# Patient Record
Sex: FEMALE | Race: WHITE
[De-identification: names, ages, dates, MRNs, and addresses within clinical notes are randomized per-mention and may not be internally consistent; named-entity substitution may affect disease eponyms.]

---

## 2017-01-31 RX ORDER — ATENOLOL 50 MG/1
TABLET ORAL
Qty: 180 TAB | Refills: 0 | Status: SHIPPED | OUTPATIENT
Start: 2017-01-31

## 2017-02-13 RX ORDER — VERAPAMIL HYDROCHLORIDE 240 MG/1
TABLET, FILM COATED, EXTENDED RELEASE ORAL
Qty: 90 TAB | Refills: 0 | Status: SHIPPED | OUTPATIENT
Start: 2017-02-13

## 2017-02-13 RX ORDER — MISOPROSTOL 200 UG/1
TABLET ORAL
Qty: 90 TAB | Refills: 0 | Status: SHIPPED | OUTPATIENT
Start: 2017-02-13

## 2017-02-13 NOTE — TELEPHONE ENCOUNTER
Was the patient seen in the last year in this department? Yes 2/2/16    Does patient have an active prescription for medications requested? No     Received Request Via: Pharmacy

## 2017-03-16 ENCOUNTER — HOSPITAL ENCOUNTER (OUTPATIENT)
Dept: HOSPITAL 8 - CFH | Age: 77
Discharge: HOME | End: 2017-03-16
Attending: SURGERY
Payer: MEDICARE

## 2017-03-16 DIAGNOSIS — K63.89: ICD-10-CM

## 2017-03-16 DIAGNOSIS — J47.9: ICD-10-CM

## 2017-03-16 DIAGNOSIS — R91.1: ICD-10-CM

## 2017-03-16 DIAGNOSIS — M85.88: ICD-10-CM

## 2017-03-16 DIAGNOSIS — N95.8: ICD-10-CM

## 2017-03-16 DIAGNOSIS — C18.9: ICD-10-CM

## 2017-03-16 DIAGNOSIS — J43.8: ICD-10-CM

## 2017-03-16 DIAGNOSIS — M47.896: ICD-10-CM

## 2017-03-16 DIAGNOSIS — Z13.820: Primary | ICD-10-CM

## 2017-03-16 DIAGNOSIS — Z01.818: ICD-10-CM

## 2017-03-16 PROCEDURE — 71260 CT THORAX DX C+: CPT

## 2017-03-16 PROCEDURE — 82565 ASSAY OF CREATININE: CPT

## 2017-03-16 PROCEDURE — 74177 CT ABD & PELVIS W/CONTRAST: CPT

## 2017-03-16 PROCEDURE — 77080 DXA BONE DENSITY AXIAL: CPT

## 2017-05-25 ENCOUNTER — HOSPITAL ENCOUNTER (INPATIENT)
Dept: HOSPITAL 8 - OR | Age: 77
LOS: 1 days | Discharge: HOME | DRG: 394 | End: 2017-05-26
Admitting: INTERNAL MEDICINE
Payer: MEDICARE

## 2017-05-25 VITALS — BODY MASS INDEX: 40.08 KG/M2 | HEIGHT: 63 IN | WEIGHT: 226.19 LBS

## 2017-05-25 VITALS — SYSTOLIC BLOOD PRESSURE: 188 MMHG | DIASTOLIC BLOOD PRESSURE: 83 MMHG

## 2017-05-25 DIAGNOSIS — Z88.5: ICD-10-CM

## 2017-05-25 DIAGNOSIS — M47.816: ICD-10-CM

## 2017-05-25 DIAGNOSIS — E44.1: ICD-10-CM

## 2017-05-25 DIAGNOSIS — Z99.81: ICD-10-CM

## 2017-05-25 DIAGNOSIS — E03.9: ICD-10-CM

## 2017-05-25 DIAGNOSIS — I11.9: ICD-10-CM

## 2017-05-25 DIAGNOSIS — Z87.11: ICD-10-CM

## 2017-05-25 DIAGNOSIS — K64.8: Primary | ICD-10-CM

## 2017-05-25 DIAGNOSIS — Z86.010: ICD-10-CM

## 2017-05-25 DIAGNOSIS — Z88.8: ICD-10-CM

## 2017-05-25 DIAGNOSIS — Z85.048: ICD-10-CM

## 2017-05-25 DIAGNOSIS — Z90.49: ICD-10-CM

## 2017-05-25 DIAGNOSIS — M19.90: ICD-10-CM

## 2017-05-25 DIAGNOSIS — D50.9: ICD-10-CM

## 2017-05-25 DIAGNOSIS — E78.5: ICD-10-CM

## 2017-05-25 DIAGNOSIS — M81.0: ICD-10-CM

## 2017-05-25 DIAGNOSIS — D62: ICD-10-CM

## 2017-05-25 DIAGNOSIS — J44.9: ICD-10-CM

## 2017-05-25 DIAGNOSIS — Z66: ICD-10-CM

## 2017-05-25 DIAGNOSIS — E55.9: ICD-10-CM

## 2017-05-25 DIAGNOSIS — Z87.891: ICD-10-CM

## 2017-05-25 LAB
BUN SERPL-MCNC: 17 MG/DL (ref 7–18)
TIBC SERPL-MCNC: 502 MCG/DL (ref 250–450)

## 2017-05-25 PROCEDURE — 86850 RBC ANTIBODY SCREEN: CPT

## 2017-05-25 PROCEDURE — 80048 BASIC METABOLIC PNL TOTAL CA: CPT

## 2017-05-25 PROCEDURE — 85651 RBC SED RATE NONAUTOMATED: CPT

## 2017-05-25 PROCEDURE — 85025 COMPLETE CBC W/AUTO DIFF WBC: CPT

## 2017-05-25 PROCEDURE — 86900 BLOOD TYPING SEROLOGIC ABO: CPT

## 2017-05-25 PROCEDURE — 93005 ELECTROCARDIOGRAM TRACING: CPT

## 2017-05-25 PROCEDURE — 80053 COMPREHEN METABOLIC PANEL: CPT

## 2017-05-25 PROCEDURE — 36415 COLL VENOUS BLD VENIPUNCTURE: CPT

## 2017-05-25 PROCEDURE — 96374 THER/PROPH/DIAG INJ IV PUSH: CPT

## 2017-05-25 PROCEDURE — 83550 IRON BINDING TEST: CPT

## 2017-05-25 PROCEDURE — 83540 ASSAY OF IRON: CPT

## 2017-05-25 PROCEDURE — 85730 THROMBOPLASTIN TIME PARTIAL: CPT

## 2017-05-25 PROCEDURE — 82040 ASSAY OF SERUM ALBUMIN: CPT

## 2017-05-25 PROCEDURE — 99151 MOD SED SAME PHYS/QHP <5 YRS: CPT

## 2017-05-25 PROCEDURE — 85610 PROTHROMBIN TIME: CPT

## 2017-05-25 PROCEDURE — 85018 HEMOGLOBIN: CPT

## 2017-05-25 PROCEDURE — 82728 ASSAY OF FERRITIN: CPT

## 2017-05-25 RX ADMIN — POLYETHYLENE GLYCOL-3350 AND ELECTROLYTES SCH ML: 236; 6.74; 5.86; 2.97; 22.74 POWDER, FOR SOLUTION ORAL at 20:10

## 2017-05-25 RX ADMIN — ATENOLOL SCH MG: 50 TABLET ORAL at 21:43

## 2017-05-25 RX ADMIN — PANTOPRAZOLE SODIUM SCH MG: 40 TABLET, DELAYED RELEASE ORAL at 17:00

## 2017-05-26 VITALS — SYSTOLIC BLOOD PRESSURE: 145 MMHG | DIASTOLIC BLOOD PRESSURE: 75 MMHG

## 2017-05-26 VITALS — DIASTOLIC BLOOD PRESSURE: 61 MMHG | SYSTOLIC BLOOD PRESSURE: 104 MMHG

## 2017-05-26 VITALS — SYSTOLIC BLOOD PRESSURE: 130 MMHG | DIASTOLIC BLOOD PRESSURE: 74 MMHG

## 2017-05-26 VITALS — DIASTOLIC BLOOD PRESSURE: 76 MMHG | SYSTOLIC BLOOD PRESSURE: 178 MMHG

## 2017-05-26 LAB
AST SERPL-CCNC: 17 U/L (ref 15–37)
BUN SERPL-MCNC: 9 MG/DL (ref 7–18)

## 2017-05-26 PROCEDURE — 0DJD8ZZ INSPECTION OF LOWER INTESTINAL TRACT, VIA NATURAL OR ARTIFICIAL OPENING ENDOSCOPIC: ICD-10-PCS | Performed by: INTERNAL MEDICINE

## 2017-05-26 RX ADMIN — POLYETHYLENE GLYCOL-3350 AND ELECTROLYTES SCH ML: 236; 6.74; 5.86; 2.97; 22.74 POWDER, FOR SOLUTION ORAL at 03:34

## 2017-05-26 RX ADMIN — ATENOLOL SCH MG: 50 TABLET ORAL at 08:28

## 2017-05-26 RX ADMIN — PANTOPRAZOLE SODIUM SCH MG: 40 TABLET, DELAYED RELEASE ORAL at 08:00

## 2017-07-03 RX ORDER — LEVOTHYROXINE SODIUM 0.05 MG/1
TABLET ORAL
Qty: 90 TAB | Refills: 3 | Status: SHIPPED | OUTPATIENT
Start: 2017-07-03

## 2018-12-10 ENCOUNTER — HOSPITAL ENCOUNTER (OUTPATIENT)
Dept: HOSPITAL 8 - LAB | Age: 78
Discharge: HOME | End: 2018-12-10
Attending: INTERNAL MEDICINE
Payer: MEDICARE

## 2018-12-10 DIAGNOSIS — E03.8: ICD-10-CM

## 2018-12-10 DIAGNOSIS — D50.9: Primary | ICD-10-CM

## 2018-12-10 LAB
BASOPHILS # BLD AUTO: 0.05 X10^3/UL (ref 0–0.1)
BASOPHILS NFR BLD AUTO: 1 % (ref 0–1)
CREAT SERPL-MCNC: 1.06 MG/DL (ref 0.55–1.02)
EOSINOPHIL # BLD AUTO: 0.26 X10^3/UL (ref 0–0.4)
EOSINOPHIL NFR BLD AUTO: 3 % (ref 1–7)
ERYTHROCYTE [DISTWIDTH] IN BLOOD BY AUTOMATED COUNT: 14 % (ref 9.6–15.2)
LYMPHOCYTES # BLD AUTO: 1.79 X10^3/UL (ref 1–3.4)
LYMPHOCYTES NFR BLD AUTO: 19 % (ref 22–44)
MCH RBC QN AUTO: 30.7 PG (ref 27–34.8)
MCHC RBC AUTO-ENTMCNC: 32.5 G/DL (ref 32.4–35.8)
MCV RBC AUTO: 94.5 FL (ref 80–100)
MD: NO
MONOCYTES # BLD AUTO: 0.61 X10^3/UL (ref 0.2–0.8)
MONOCYTES NFR BLD AUTO: 7 % (ref 2–9)
NEUTROPHILS # BLD AUTO: 6.67 X10^3/UL (ref 1.8–6.8)
NEUTROPHILS NFR BLD AUTO: 71 % (ref 42–75)
PLATELET # BLD AUTO: 282 X10^3/UL (ref 130–400)
PMV BLD AUTO: 7.2 FL (ref 7.4–10.4)
RBC # BLD AUTO: 4.58 X10^6/UL (ref 3.82–5.3)
T4 FREE SERPL-MCNC: 1.21 NG/DL (ref 0.76–1.46)
TSH SERPL-ACNC: 3.54 MIU/L (ref 0.36–3.74)

## 2018-12-10 PROCEDURE — 85025 COMPLETE CBC W/AUTO DIFF WBC: CPT

## 2018-12-10 PROCEDURE — 84443 ASSAY THYROID STIM HORMONE: CPT

## 2018-12-10 PROCEDURE — 82728 ASSAY OF FERRITIN: CPT

## 2018-12-10 PROCEDURE — 36415 COLL VENOUS BLD VENIPUNCTURE: CPT

## 2018-12-10 PROCEDURE — 84439 ASSAY OF FREE THYROXINE: CPT

## 2018-12-10 PROCEDURE — 82565 ASSAY OF CREATININE: CPT

## 2018-12-13 ENCOUNTER — HOSPITAL ENCOUNTER (OUTPATIENT)
Dept: HOSPITAL 8 - RAD | Age: 78
Discharge: HOME | End: 2018-12-13
Attending: INTERNAL MEDICINE
Payer: MEDICARE

## 2018-12-13 DIAGNOSIS — G57.33: ICD-10-CM

## 2018-12-13 DIAGNOSIS — G31.9: Primary | ICD-10-CM

## 2018-12-13 DIAGNOSIS — J44.9: ICD-10-CM

## 2018-12-13 PROCEDURE — A9585 GADOBUTROL INJECTION: HCPCS

## 2018-12-13 PROCEDURE — 70553 MRI BRAIN STEM W/O & W/DYE: CPT

## 2019-06-06 ENCOUNTER — HOSPITAL ENCOUNTER (OUTPATIENT)
Dept: HOSPITAL 8 - CARD | Age: 79
Discharge: HOME | End: 2019-06-06
Attending: REGISTERED NURSE
Payer: MEDICARE

## 2019-06-06 DIAGNOSIS — E03.9: ICD-10-CM

## 2019-06-06 DIAGNOSIS — Z88.5: ICD-10-CM

## 2019-06-06 DIAGNOSIS — Z88.8: ICD-10-CM

## 2019-06-06 DIAGNOSIS — I10: ICD-10-CM

## 2019-06-06 DIAGNOSIS — J44.9: Primary | ICD-10-CM

## 2019-06-06 PROCEDURE — 94729 DIFFUSING CAPACITY: CPT

## 2019-06-06 PROCEDURE — 94618 PULMONARY STRESS TESTING: CPT

## 2019-06-06 PROCEDURE — 94726 PLETHYSMOGRAPHY LUNG VOLUMES: CPT

## 2019-06-06 PROCEDURE — 94060 EVALUATION OF WHEEZING: CPT

## 2019-08-06 ENCOUNTER — HOSPITAL ENCOUNTER (OUTPATIENT)
Dept: HOSPITAL 8 - CFH | Age: 79
Discharge: HOME | End: 2019-08-06
Attending: INTERNAL MEDICINE
Payer: MEDICARE

## 2019-08-06 DIAGNOSIS — Z87.891: ICD-10-CM

## 2019-08-06 DIAGNOSIS — I10: ICD-10-CM

## 2019-08-06 DIAGNOSIS — J43.9: ICD-10-CM

## 2019-08-06 DIAGNOSIS — I35.8: ICD-10-CM

## 2019-08-06 DIAGNOSIS — Z01.810: Primary | ICD-10-CM

## 2019-08-06 PROCEDURE — 93306 TTE W/DOPPLER COMPLETE: CPT

## 2019-08-06 PROCEDURE — 93017 CV STRESS TEST TRACING ONLY: CPT

## 2019-08-06 PROCEDURE — A9502 TC99M TETROFOSMIN: HCPCS

## 2019-08-06 PROCEDURE — 78452 HT MUSCLE IMAGE SPECT MULT: CPT

## 2020-01-18 ENCOUNTER — HOSPITAL ENCOUNTER (INPATIENT)
Dept: HOSPITAL 8 - ED | Age: 80
LOS: 3 days | Discharge: HOME | DRG: 280 | End: 2020-01-21
Attending: INTERNAL MEDICINE | Admitting: HOSPITALIST
Payer: MEDICARE

## 2020-01-18 VITALS — HEIGHT: 63 IN | WEIGHT: 229.06 LBS | BODY MASS INDEX: 40.59 KG/M2

## 2020-01-18 DIAGNOSIS — Z99.81: ICD-10-CM

## 2020-01-18 DIAGNOSIS — J96.21: ICD-10-CM

## 2020-01-18 DIAGNOSIS — Z85.038: ICD-10-CM

## 2020-01-18 DIAGNOSIS — J44.1: ICD-10-CM

## 2020-01-18 DIAGNOSIS — Z88.8: ICD-10-CM

## 2020-01-18 DIAGNOSIS — I26.99: ICD-10-CM

## 2020-01-18 DIAGNOSIS — Z88.5: ICD-10-CM

## 2020-01-18 DIAGNOSIS — I27.81: ICD-10-CM

## 2020-01-18 DIAGNOSIS — Z90.49: ICD-10-CM

## 2020-01-18 DIAGNOSIS — Z82.5: ICD-10-CM

## 2020-01-18 DIAGNOSIS — E03.9: ICD-10-CM

## 2020-01-18 DIAGNOSIS — I21.4: Primary | ICD-10-CM

## 2020-01-18 DIAGNOSIS — Z96.651: ICD-10-CM

## 2020-01-18 DIAGNOSIS — E78.5: ICD-10-CM

## 2020-01-18 DIAGNOSIS — I10: ICD-10-CM

## 2020-01-18 DIAGNOSIS — D69.6: ICD-10-CM

## 2020-01-18 DIAGNOSIS — Z87.891: ICD-10-CM

## 2020-01-18 DIAGNOSIS — Z79.01: ICD-10-CM

## 2020-01-18 DIAGNOSIS — J98.11: ICD-10-CM

## 2020-01-18 DIAGNOSIS — E66.01: ICD-10-CM

## 2020-01-18 DIAGNOSIS — Z80.3: ICD-10-CM

## 2020-01-18 LAB — TROPONIN I SERPL-MCNC: 1.7 NG/ML (ref 0–0.04)

## 2020-01-18 PROCEDURE — 84100 ASSAY OF PHOSPHORUS: CPT

## 2020-01-18 PROCEDURE — 94640 AIRWAY INHALATION TREATMENT: CPT

## 2020-01-18 PROCEDURE — 83735 ASSAY OF MAGNESIUM: CPT

## 2020-01-18 PROCEDURE — 84484 ASSAY OF TROPONIN QUANT: CPT

## 2020-01-18 PROCEDURE — 86140 C-REACTIVE PROTEIN: CPT

## 2020-01-18 PROCEDURE — 93005 ELECTROCARDIOGRAM TRACING: CPT

## 2020-01-18 PROCEDURE — 85025 COMPLETE CBC W/AUTO DIFF WBC: CPT

## 2020-01-18 PROCEDURE — 36415 COLL VENOUS BLD VENIPUNCTURE: CPT

## 2020-01-18 PROCEDURE — 80053 COMPREHEN METABOLIC PANEL: CPT

## 2020-01-18 PROCEDURE — 80048 BASIC METABOLIC PNL TOTAL CA: CPT

## 2020-01-18 PROCEDURE — 93306 TTE W/DOPPLER COMPLETE: CPT

## 2020-01-18 PROCEDURE — 96365 THER/PROPH/DIAG IV INF INIT: CPT

## 2020-01-18 PROCEDURE — 71275 CT ANGIOGRAPHY CHEST: CPT

## 2020-01-18 PROCEDURE — 85520 HEPARIN ASSAY: CPT

## 2020-01-18 PROCEDURE — 83880 ASSAY OF NATRIURETIC PEPTIDE: CPT

## 2020-01-18 NOTE — NUR
PT UP TO BSC ONE ASSIST PT O2 DESAT TO 78% PT O2 INCREASED TO 8L MASK AND PT UP 
TO 90% RESTING IN BED

## 2020-01-19 VITALS — DIASTOLIC BLOOD PRESSURE: 99 MMHG | SYSTOLIC BLOOD PRESSURE: 169 MMHG

## 2020-01-19 VITALS — SYSTOLIC BLOOD PRESSURE: 153 MMHG | DIASTOLIC BLOOD PRESSURE: 82 MMHG

## 2020-01-19 VITALS — SYSTOLIC BLOOD PRESSURE: 176 MMHG | DIASTOLIC BLOOD PRESSURE: 92 MMHG

## 2020-01-19 LAB
ALBUMIN SERPL-MCNC: 2.6 G/DL (ref 3.4–5)
ALP SERPL-CCNC: 70 U/L (ref 45–117)
ALT SERPL-CCNC: 20 U/L (ref 12–78)
ANION GAP SERPL CALC-SCNC: 7 MMOL/L (ref 5–15)
BASOPHILS # BLD AUTO: 0 X10^3/UL (ref 0–0.1)
BASOPHILS NFR BLD AUTO: 0 % (ref 0–1)
BILIRUB SERPL-MCNC: 0.3 MG/DL (ref 0.2–1)
CALCIUM SERPL-MCNC: 9 MG/DL (ref 8.5–10.1)
CHLORIDE SERPL-SCNC: 107 MMOL/L (ref 98–107)
CREAT SERPL-MCNC: 0.73 MG/DL (ref 0.55–1.02)
EOSINOPHIL # BLD AUTO: 0 X10^3/UL (ref 0–0.4)
EOSINOPHIL NFR BLD AUTO: 0 % (ref 1–7)
ERYTHROCYTE [DISTWIDTH] IN BLOOD BY AUTOMATED COUNT: 14.3 % (ref 9.6–15.2)
LYMPHOCYTES # BLD AUTO: 0.6 X10^3/UL (ref 1–3.4)
LYMPHOCYTES NFR BLD AUTO: 8 % (ref 22–44)
MCH RBC QN AUTO: 30.1 PG (ref 27–34.8)
MCHC RBC AUTO-ENTMCNC: 32.2 G/DL (ref 32.4–35.8)
MCV RBC AUTO: 93.5 FL (ref 80–100)
MD: NO
MONOCYTES # BLD AUTO: 0.27 X10^3/UL (ref 0.2–0.8)
MONOCYTES NFR BLD AUTO: 3 % (ref 2–9)
NEUTROPHILS # BLD AUTO: 7.09 X10^3/UL (ref 1.8–6.8)
NEUTROPHILS NFR BLD AUTO: 89 % (ref 42–75)
PLATELET # BLD AUTO: 127 X10^3/UL (ref 130–400)
PMV BLD AUTO: 7.8 FL (ref 7.4–10.4)
PROT SERPL-MCNC: 7 G/DL (ref 6.4–8.2)
RBC # BLD AUTO: 4.04 X10^6/UL (ref 3.82–5.3)
TROPONIN I SERPL-MCNC: 1.26 NG/ML (ref 0–0.04)
TROPONIN I SERPL-MCNC: 1.28 NG/ML (ref 0–0.04)

## 2020-01-19 RX ADMIN — METOPROLOL TARTRATE SCH MG: 50 TABLET, FILM COATED ORAL at 18:10

## 2020-01-19 RX ADMIN — PANTOPRAZOLE SODIUM SCH MG: 40 TABLET, DELAYED RELEASE ORAL at 08:00

## 2020-01-19 RX ADMIN — LISINOPRIL SCH MG: 5 TABLET ORAL at 11:32

## 2020-01-19 RX ADMIN — VERAPAMIL HYDROCHLORIDE SCH MG: 240 TABLET, FILM COATED, EXTENDED RELEASE ORAL at 09:47

## 2020-01-19 RX ADMIN — INSULIN LISPRO SCH NOTE: 100 INJECTION, SOLUTION INTRAVENOUS; SUBCUTANEOUS at 09:30

## 2020-01-19 RX ADMIN — INSULIN LISPRO SCH NOTE: 100 INJECTION, SOLUTION INTRAVENOUS; SUBCUTANEOUS at 16:07

## 2020-01-19 RX ADMIN — METOPROLOL TARTRATE SCH MG: 50 TABLET, FILM COATED ORAL at 11:32

## 2020-01-19 RX ADMIN — FERROUS SULFATE TAB 325 MG (65 MG ELEMENTAL FE) SCH MG: 325 (65 FE) TAB at 16:07

## 2020-01-19 RX ADMIN — DOCUSATE SODIUM 50MG AND SENNOSIDES 8.6MG SCH TAB: 8.6; 5 TABLET, FILM COATED ORAL at 09:47

## 2020-01-19 RX ADMIN — SODIUM CHLORIDE, PRESERVATIVE FREE SCH ML: 5 INJECTION INTRAVENOUS at 20:21

## 2020-01-19 RX ADMIN — MISOPROSTOL SCH MCG: 200 TABLET ORAL at 11:32

## 2020-01-19 RX ADMIN — SODIUM CHLORIDE, PRESERVATIVE FREE SCH ML: 5 INJECTION INTRAVENOUS at 01:12

## 2020-01-19 RX ADMIN — ACETAMINOPHEN PRN MG: 325 TABLET, FILM COATED ORAL at 20:29

## 2020-01-19 RX ADMIN — HEPARIN SODIUM PRN UNITS: 5000 INJECTION, SOLUTION INTRAVENOUS; SUBCUTANEOUS at 11:46

## 2020-01-19 RX ADMIN — FERROUS SULFATE TAB 325 MG (65 MG ELEMENTAL FE) SCH MG: 325 (65 FE) TAB at 08:00

## 2020-01-19 RX ADMIN — LEVOTHYROXINE SODIUM SCH MCG: 50 TABLET ORAL at 09:47

## 2020-01-19 RX ADMIN — HEPARIN SODIUM PRN UNITS: 5000 INJECTION, SOLUTION INTRAVENOUS; SUBCUTANEOUS at 18:17

## 2020-01-19 RX ADMIN — SODIUM CHLORIDE, PRESERVATIVE FREE SCH ML: 5 INJECTION INTRAVENOUS at 09:48

## 2020-01-19 RX ADMIN — PANTOPRAZOLE SODIUM SCH MG: 40 TABLET, DELAYED RELEASE ORAL at 16:07

## 2020-01-19 RX ADMIN — INSULIN LISPRO SCH NOTE: 100 INJECTION, SOLUTION INTRAVENOUS; SUBCUTANEOUS at 01:30

## 2020-01-20 VITALS — DIASTOLIC BLOOD PRESSURE: 79 MMHG | SYSTOLIC BLOOD PRESSURE: 126 MMHG

## 2020-01-20 VITALS — DIASTOLIC BLOOD PRESSURE: 80 MMHG | SYSTOLIC BLOOD PRESSURE: 157 MMHG

## 2020-01-20 VITALS — DIASTOLIC BLOOD PRESSURE: 72 MMHG | SYSTOLIC BLOOD PRESSURE: 119 MMHG

## 2020-01-20 VITALS — DIASTOLIC BLOOD PRESSURE: 72 MMHG | SYSTOLIC BLOOD PRESSURE: 120 MMHG

## 2020-01-20 RX ADMIN — IPRATROPIUM BROMIDE AND ALBUTEROL SULFATE SCH ML: 2.5; .5 SOLUTION RESPIRATORY (INHALATION) at 21:00

## 2020-01-20 RX ADMIN — INSULIN LISPRO SCH NOTE: 100 INJECTION, SOLUTION INTRAVENOUS; SUBCUTANEOUS at 01:30

## 2020-01-20 RX ADMIN — METOPROLOL TARTRATE SCH MG: 50 TABLET, FILM COATED ORAL at 08:51

## 2020-01-20 RX ADMIN — MISOPROSTOL SCH MCG: 200 TABLET ORAL at 09:00

## 2020-01-20 RX ADMIN — PANTOPRAZOLE SODIUM SCH MG: 40 TABLET, DELAYED RELEASE ORAL at 08:00

## 2020-01-20 RX ADMIN — DOCUSATE SODIUM 50MG AND SENNOSIDES 8.6MG SCH TAB: 8.6; 5 TABLET, FILM COATED ORAL at 08:46

## 2020-01-20 RX ADMIN — BUDESONIDE SCH MG: 0.5 INHALANT RESPIRATORY (INHALATION) at 21:00

## 2020-01-20 RX ADMIN — ACETAMINOPHEN PRN MG: 325 TABLET, FILM COATED ORAL at 08:45

## 2020-01-20 RX ADMIN — LISINOPRIL SCH MG: 5 TABLET ORAL at 08:46

## 2020-01-20 RX ADMIN — APIXABAN SCH MG: 5 TABLET, FILM COATED ORAL at 20:37

## 2020-01-20 RX ADMIN — IPRATROPIUM BROMIDE AND ALBUTEROL SULFATE SCH ML: 2.5; .5 SOLUTION RESPIRATORY (INHALATION) at 14:44

## 2020-01-20 RX ADMIN — VERAPAMIL HYDROCHLORIDE SCH MG: 240 TABLET, FILM COATED, EXTENDED RELEASE ORAL at 08:45

## 2020-01-20 RX ADMIN — FERROUS SULFATE TAB 325 MG (65 MG ELEMENTAL FE) SCH MG: 325 (65 FE) TAB at 08:00

## 2020-01-20 RX ADMIN — SODIUM CHLORIDE, PRESERVATIVE FREE SCH ML: 5 INJECTION INTRAVENOUS at 08:47

## 2020-01-20 RX ADMIN — METOPROLOL TARTRATE SCH MG: 50 TABLET, FILM COATED ORAL at 17:50

## 2020-01-20 RX ADMIN — LEVOTHYROXINE SODIUM SCH MCG: 50 TABLET ORAL at 08:45

## 2020-01-21 VITALS — SYSTOLIC BLOOD PRESSURE: 111 MMHG | DIASTOLIC BLOOD PRESSURE: 70 MMHG

## 2020-01-21 VITALS — SYSTOLIC BLOOD PRESSURE: 136 MMHG | DIASTOLIC BLOOD PRESSURE: 81 MMHG

## 2020-01-21 LAB
ANION GAP SERPL CALC-SCNC: 5 MMOL/L (ref 5–15)
BASOPHILS # BLD AUTO: 0.02 X10^3/UL (ref 0–0.1)
BASOPHILS NFR BLD AUTO: 0 % (ref 0–1)
CALCIUM SERPL-MCNC: 8.5 MG/DL (ref 8.5–10.1)
CHLORIDE SERPL-SCNC: 108 MMOL/L (ref 98–107)
CREAT SERPL-MCNC: 0.66 MG/DL (ref 0.55–1.02)
EOSINOPHIL # BLD AUTO: 0.17 X10^3/UL (ref 0–0.4)
EOSINOPHIL NFR BLD AUTO: 3 % (ref 1–7)
ERYTHROCYTE [DISTWIDTH] IN BLOOD BY AUTOMATED COUNT: 13.8 % (ref 9.6–15.2)
LYMPHOCYTES # BLD AUTO: 1.17 X10^3/UL (ref 1–3.4)
LYMPHOCYTES NFR BLD AUTO: 18 % (ref 22–44)
MCH RBC QN AUTO: 30.6 PG (ref 27–34.8)
MCHC RBC AUTO-ENTMCNC: 32.6 G/DL (ref 32.4–35.8)
MCV RBC AUTO: 93.7 FL (ref 80–100)
MD: NO
MONOCYTES # BLD AUTO: 0.61 X10^3/UL (ref 0.2–0.8)
MONOCYTES NFR BLD AUTO: 9 % (ref 2–9)
NEUTROPHILS # BLD AUTO: 4.73 X10^3/UL (ref 1.8–6.8)
NEUTROPHILS NFR BLD AUTO: 71 % (ref 42–75)
PLATELET # BLD AUTO: 102 X10^3/UL (ref 130–400)
PMV BLD AUTO: 7.3 FL (ref 7.4–10.4)
RBC # BLD AUTO: 3.6 X10^6/UL (ref 3.82–5.3)

## 2020-01-21 RX ADMIN — DOCUSATE SODIUM 50MG AND SENNOSIDES 8.6MG SCH TAB: 8.6; 5 TABLET, FILM COATED ORAL at 08:44

## 2020-01-21 RX ADMIN — ACETAMINOPHEN PRN MG: 325 TABLET, FILM COATED ORAL at 06:08

## 2020-01-21 RX ADMIN — IPRATROPIUM BROMIDE AND ALBUTEROL SULFATE SCH ML: 2.5; .5 SOLUTION RESPIRATORY (INHALATION) at 09:19

## 2020-01-21 RX ADMIN — BUDESONIDE SCH MG: 0.5 INHALANT RESPIRATORY (INHALATION) at 09:20

## 2020-01-21 RX ADMIN — LISINOPRIL SCH MG: 5 TABLET ORAL at 08:42

## 2020-01-21 RX ADMIN — APIXABAN SCH MG: 5 TABLET, FILM COATED ORAL at 08:41

## 2020-01-21 RX ADMIN — LEVOTHYROXINE SODIUM SCH MCG: 50 TABLET ORAL at 08:42

## 2020-01-21 RX ADMIN — METOPROLOL TARTRATE SCH MG: 50 TABLET, FILM COATED ORAL at 05:51

## 2020-01-21 RX ADMIN — VERAPAMIL HYDROCHLORIDE SCH MG: 240 TABLET, FILM COATED, EXTENDED RELEASE ORAL at 08:43

## 2020-01-21 RX ADMIN — MISOPROSTOL SCH MCG: 200 TABLET ORAL at 08:42

## 2020-01-21 RX ADMIN — IPRATROPIUM BROMIDE AND ALBUTEROL SULFATE SCH ML: 2.5; .5 SOLUTION RESPIRATORY (INHALATION) at 02:43

## 2020-05-27 ENCOUNTER — HOSPITAL ENCOUNTER (OUTPATIENT)
Dept: LAB | Facility: MEDICAL CENTER | Age: 80
End: 2020-05-27
Attending: INTERNAL MEDICINE
Payer: MEDICARE

## 2020-05-27 LAB
ALBUMIN SERPL BCP-MCNC: 3.9 G/DL (ref 3.2–4.9)
ALBUMIN/GLOB SERPL: 1.2 G/DL
ALP SERPL-CCNC: 49 U/L (ref 30–99)
ALT SERPL-CCNC: 9 U/L (ref 2–50)
ANION GAP SERPL CALC-SCNC: 12 MMOL/L (ref 7–16)
AST SERPL-CCNC: 14 U/L (ref 12–45)
BASOPHILS # BLD AUTO: 0.4 % (ref 0–1.8)
BASOPHILS # BLD: 0.03 K/UL (ref 0–0.12)
BILIRUB SERPL-MCNC: 0.3 MG/DL (ref 0.1–1.5)
BUN SERPL-MCNC: 24 MG/DL (ref 8–22)
CALCIUM SERPL-MCNC: 9.4 MG/DL (ref 8.5–10.5)
CHLORIDE SERPL-SCNC: 100 MMOL/L (ref 96–112)
CHOLEST SERPL-MCNC: 236 MG/DL (ref 100–199)
CO2 SERPL-SCNC: 26 MMOL/L (ref 20–33)
COMMENT 1642: NORMAL
CREAT SERPL-MCNC: 0.9 MG/DL (ref 0.5–1.4)
EOSINOPHIL # BLD AUTO: 0.37 K/UL (ref 0–0.51)
EOSINOPHIL NFR BLD: 4.9 % (ref 0–6.9)
ERYTHROCYTE [DISTWIDTH] IN BLOOD BY AUTOMATED COUNT: 46.8 FL (ref 35.9–50)
EST. AVERAGE GLUCOSE BLD GHB EST-MCNC: 108 MG/DL
GLOBULIN SER CALC-MCNC: 3.2 G/DL (ref 1.9–3.5)
GLUCOSE SERPL-MCNC: 101 MG/DL (ref 65–99)
HBA1C MFR BLD: 5.4 % (ref 0–5.6)
HCT VFR BLD AUTO: 39.3 % (ref 37–47)
HDLC SERPL-MCNC: 46 MG/DL
HGB BLD-MCNC: 11.6 G/DL (ref 12–16)
HYPOCHROMIA BLD QL SMEAR: ABNORMAL
IMM GRANULOCYTES # BLD AUTO: 0.05 K/UL (ref 0–0.11)
IMM GRANULOCYTES NFR BLD AUTO: 0.7 % (ref 0–0.9)
LDLC SERPL CALC-MCNC: 145 MG/DL
LYMPHOCYTES # BLD AUTO: 1.19 K/UL (ref 1–4.8)
LYMPHOCYTES NFR BLD: 15.7 % (ref 22–41)
MACROCYTES BLD QL SMEAR: ABNORMAL
MCH RBC QN AUTO: 28 PG (ref 27–33)
MCHC RBC AUTO-ENTMCNC: 29.5 G/DL (ref 33.6–35)
MCV RBC AUTO: 94.7 FL (ref 81.4–97.8)
MONOCYTES # BLD AUTO: 0.72 K/UL (ref 0–0.85)
MONOCYTES NFR BLD AUTO: 9.5 % (ref 0–13.4)
MORPHOLOGY BLD-IMP: NORMAL
NEUTROPHILS # BLD AUTO: 5.21 K/UL (ref 2–7.15)
NEUTROPHILS NFR BLD: 68.8 % (ref 44–72)
NRBC # BLD AUTO: 0 K/UL
NRBC BLD-RTO: 0 /100 WBC
OVALOCYTES BLD QL SMEAR: NORMAL
PLATELET # BLD AUTO: 292 K/UL (ref 164–446)
PLATELET BLD QL SMEAR: NORMAL
PMV BLD AUTO: 9.5 FL (ref 9–12.9)
POIKILOCYTOSIS BLD QL SMEAR: NORMAL
POLYCHROMASIA BLD QL SMEAR: NORMAL
POTASSIUM SERPL-SCNC: 4.4 MMOL/L (ref 3.6–5.5)
PROT SERPL-MCNC: 7.1 G/DL (ref 6–8.2)
RBC # BLD AUTO: 4.15 M/UL (ref 4.2–5.4)
RBC BLD AUTO: PRESENT
SODIUM SERPL-SCNC: 138 MMOL/L (ref 135–145)
TRIGL SERPL-MCNC: 224 MG/DL (ref 0–149)
TSH SERPL DL<=0.005 MIU/L-ACNC: 3.14 UIU/ML (ref 0.38–5.33)
WBC # BLD AUTO: 7.6 K/UL (ref 4.8–10.8)

## 2020-05-27 PROCEDURE — 80061 LIPID PANEL: CPT

## 2020-05-27 PROCEDURE — 84443 ASSAY THYROID STIM HORMONE: CPT

## 2020-05-27 PROCEDURE — 85025 COMPLETE CBC W/AUTO DIFF WBC: CPT

## 2020-05-27 PROCEDURE — 83036 HEMOGLOBIN GLYCOSYLATED A1C: CPT | Mod: GA

## 2020-05-27 PROCEDURE — 36415 COLL VENOUS BLD VENIPUNCTURE: CPT | Mod: GA

## 2020-05-27 PROCEDURE — 84439 ASSAY OF FREE THYROXINE: CPT

## 2020-05-27 PROCEDURE — 80053 COMPREHEN METABOLIC PANEL: CPT

## 2020-06-01 LAB — T4 FREE SERPL DIALY-MCNC: 2.1 NG/DL (ref 1.1–2.4)

## 2020-06-05 ENCOUNTER — HOSPITAL ENCOUNTER (OUTPATIENT)
Dept: HOSPITAL 8 - CVU | Age: 80
Discharge: HOME | End: 2020-06-05
Attending: INTERNAL MEDICINE
Payer: MEDICARE

## 2020-06-05 ENCOUNTER — HOSPITAL ENCOUNTER (OUTPATIENT)
Dept: HOSPITAL 8 - CFH | Age: 80
Discharge: HOME | End: 2020-06-05
Attending: REGISTERED NURSE
Payer: MEDICARE

## 2020-06-05 DIAGNOSIS — I26.99: ICD-10-CM

## 2020-06-05 DIAGNOSIS — Z87.891: ICD-10-CM

## 2020-06-05 DIAGNOSIS — I26.09: Primary | ICD-10-CM

## 2020-06-05 DIAGNOSIS — Z85.038: ICD-10-CM

## 2020-06-05 DIAGNOSIS — I08.0: Primary | ICD-10-CM

## 2020-06-05 DIAGNOSIS — I10: ICD-10-CM

## 2020-06-05 PROCEDURE — 71275 CT ANGIOGRAPHY CHEST: CPT

## 2020-06-05 PROCEDURE — 93306 TTE W/DOPPLER COMPLETE: CPT
